# Patient Record
Sex: FEMALE | Race: WHITE | NOT HISPANIC OR LATINO | Employment: FULL TIME | URBAN - METROPOLITAN AREA
[De-identification: names, ages, dates, MRNs, and addresses within clinical notes are randomized per-mention and may not be internally consistent; named-entity substitution may affect disease eponyms.]

---

## 2023-12-19 ENCOUNTER — THERAPY VISIT (OUTPATIENT)
Dept: PHYSICAL THERAPY | Facility: CLINIC | Age: 38
End: 2023-12-19
Payer: COMMERCIAL

## 2023-12-19 DIAGNOSIS — M72.2 PLANTAR FASCIITIS: Primary | ICD-10-CM

## 2023-12-19 PROCEDURE — 97140 MANUAL THERAPY 1/> REGIONS: CPT | Mod: GP | Performed by: PHYSICAL THERAPIST

## 2023-12-19 PROCEDURE — 97110 THERAPEUTIC EXERCISES: CPT | Mod: GP | Performed by: PHYSICAL THERAPIST

## 2023-12-19 PROCEDURE — 97161 PT EVAL LOW COMPLEX 20 MIN: CPT | Mod: GP | Performed by: PHYSICAL THERAPIST

## 2023-12-19 NOTE — PROGRESS NOTES
PHYSICAL THERAPY EVALUATION  Type of Visit: Evaluation    See electronic medical record for Abuse and Falls Screening details.    Subjective     Pt is a 38 year old female presenting with complaints of L foot pain located at the plantar side medial heel.   The symptoms started about 2 months ago and is getting worse. Pain is worse in the morning upon first few steps.   Pt describes the pain as sharp and achy.   Prior treatments: none. Her sister is a PT and has been giving her some exercises to try. She has trialed a splint from her dad but it started cutting off her circulation. She has trialed some taping without much benefit either.   The resulting functional limitations include running (in the beginning of the run then warms up- pain after run as well), prolonged walking/hiking.   Pain is better with massage  Sleep Quality: mildly disrupted by trial of taping or orthotic but not other wise   Current level of activity: hiking, biking.  Barriers to exercise: limitations with running   Goals of therapy: running,         Presenting condition or subjective complaint: heel pain - may be planters fasciitis 10/2023  Date of onset: 10/01/23    Relevant medical history: Depression   Prior therapy history for the same diagnosis, illness or injury: No      Prior Level of Function  Transfers: Independent  Ambulation: Independent  ADL: Independent  IADL:  Indep    Living Environment  Social support: With family members   Type of home: House   Stairs to enter the home: Yes 4 Is there a railing: Yes   Ramp: No   Stairs inside the home: Yes 30 Is there a railing: Yes   Help at home: Self Cares (home health aide/personal care attendant, family, etc)  Equipment owned:     Employment: Yes    Hobbies/Interests: Running, reading  Patient goals for therapy: run  Pain assessment: Pain present     Objective     ANKLE:    Standing/Foot Posture: flat/pronated feet- slightly worse on the L.       SL Balance:   L: 5+ sec   R: 5+ sec        ROM/Strength: (* indicates patient's pain)   AROM L AROM R MMT L MMT R   Dorsiflexion WFL WFL 5 5   Plantarflexion WFL WFL SL heel raises 20/20 SL heel raises 20/20   Inversion  WFL WFL 5 5   Eversion  WFL WFL 5 5   G Toe Ext/Flex       Posterior Tibialis    5 5       Edema:    General: none    Palpation: L plantar fascia insertion on calcaneous     Ankle/Foot Mobilizations (hyper vs hypo):   - Talocrual: normal B- slight cracking on L lateral by lateral malleolus   - Subtalar: normal B       Special Tests:   L R   Anterior Drawer - -   Posterior Drawer     Valgus Stress     Varus Stress     External Rotation     Abrams's (Achilles) - -   Calcaneal tap     Squeeze test     Windlass Test     Cory's (DVT)             Assessment & Plan   CLINICAL IMPRESSIONS  Medical Diagnosis:      Treatment Diagnosis: L Plantar Fasciitis   Impression/Assessment: Patient is a 38 year old female with L foot complaints.  The following significant findings have been identified: Pain, Impaired muscle performance, and Decreased activity tolerance. These impairments interfere with their ability to perform recreational activities as compared to previous level of function. Patient would benefit from skilled physical therapy in order to address mobility, stability, controlled mobility, functional activities, and self management/ wellness strategies in order to improve muscular strength endurance, cardiovascular fitness, neuromuscular re-education, ans self efficacy in the management of the condition.       Clinical Decision Making (Complexity):  Clinical Presentation: Stable/Uncomplicated  Clinical Presentation Rationale: based on medical and personal factors listed in PT evaluation  Clinical Decision Making (Complexity): Low complexity    PLAN OF CARE  Treatment Interventions:  Modalities: Iontophoresis  Interventions: Gait Training, Manual Therapy, Neuromuscular Re-education, Therapeutic Activity, Therapeutic Exercise,  Self-Care/Home Management    Long Term Goals     PT Goal 1  Goal Identifier: Return to Running  Goal Description: Pt will be able return to running 5 miles without increase in L foot pain in order to return to PLOF  Rationale: to maximize safety and independence with self cares  Target Date: 03/19/24      Frequency of Treatment: 2x/month  Duration of Treatment: 3 months    Recommended Referrals to Other Professionals:  None  Education Assessment:   Learner/Method: Patient  Education Comments: Eager to participate in therapy    Risks and benefits of evaluation/treatment have been explained.   Patient/Family/caregiver agrees with Plan of Care.     Evaluation Time:     PT Eval, Low Complexity Minutes (88570): 15       Signing Clinician: Catalina Duarte PT

## 2024-01-03 ENCOUNTER — THERAPY VISIT (OUTPATIENT)
Dept: PHYSICAL THERAPY | Facility: CLINIC | Age: 39
End: 2024-01-03
Payer: COMMERCIAL

## 2024-01-03 DIAGNOSIS — M72.2 PLANTAR FASCIITIS: Primary | ICD-10-CM

## 2024-01-03 PROCEDURE — 97110 THERAPEUTIC EXERCISES: CPT | Mod: GP | Performed by: PHYSICAL THERAPIST
